# Patient Record
Sex: FEMALE | Race: OTHER | NOT HISPANIC OR LATINO
[De-identification: names, ages, dates, MRNs, and addresses within clinical notes are randomized per-mention and may not be internally consistent; named-entity substitution may affect disease eponyms.]

---

## 2017-03-01 ENCOUNTER — TRANSCRIPTION ENCOUNTER (OUTPATIENT)
Age: 22
End: 2017-03-01

## 2017-10-27 ENCOUNTER — TRANSCRIPTION ENCOUNTER (OUTPATIENT)
Age: 22
End: 2017-10-27

## 2017-11-08 ENCOUNTER — TRANSCRIPTION ENCOUNTER (OUTPATIENT)
Age: 22
End: 2017-11-08

## 2018-04-01 ENCOUNTER — OUTPATIENT (OUTPATIENT)
Dept: OUTPATIENT SERVICES | Facility: HOSPITAL | Age: 23
LOS: 1 days | End: 2018-04-01
Payer: MEDICAID

## 2018-04-01 PROCEDURE — G9001: CPT

## 2018-04-09 DIAGNOSIS — R69 ILLNESS, UNSPECIFIED: ICD-10-CM

## 2018-04-27 ENCOUNTER — TRANSCRIPTION ENCOUNTER (OUTPATIENT)
Age: 23
End: 2018-04-27

## 2018-09-25 ENCOUNTER — EMERGENCY (EMERGENCY)
Facility: HOSPITAL | Age: 23
LOS: 1 days | Discharge: HOME | End: 2018-09-25
Attending: EMERGENCY MEDICINE
Payer: MEDICAID

## 2018-09-25 VITALS
DIASTOLIC BLOOD PRESSURE: 86 MMHG | HEIGHT: 64 IN | TEMPERATURE: 98 F | WEIGHT: 138.01 LBS | SYSTOLIC BLOOD PRESSURE: 126 MMHG | HEART RATE: 87 BPM | RESPIRATION RATE: 16 BRPM | OXYGEN SATURATION: 96 %

## 2018-09-25 PROCEDURE — 99282 EMERGENCY DEPT VISIT SF MDM: CPT

## 2018-09-25 PROCEDURE — 99283 EMERGENCY DEPT VISIT LOW MDM: CPT

## 2018-09-25 NOTE — ED PROCEDURE NOTE - CPROC ED POST PROC CARE GUIDE1
Instructed patient/caregiver regarding signs and symptoms of infection./PMD/neuro/hand follow up/Instructed patient/caregiver to follow-up with primary care physician./Verbal/written post procedure instructions were given to patient/caregiver.

## 2018-09-25 NOTE — ED PROVIDER NOTE - MEDICAL DECISION MAKING DETAILS
23F presenting with numbness/ tingling in the RUE x 3d, no focal deficits, no sx of CTS - will get Ortho f.u. No XR needed.

## 2018-09-25 NOTE — ED ADULT NURSE NOTE - CHPI ED NUR SYMPTOMS NEG
no change in level of consciousness/no confusion/no loss of consciousness/no blurred vision/no vomiting/no nausea/no dizziness/no fever

## 2018-09-25 NOTE — ED PROVIDER NOTE - NS ED ROS FT
rt. numbness and tingling in Rt. Digit 3/4/5 and from elbow to forearm/ intermittent  No focal deficits  Strength in tact in all extremities  No Prayer sx, No Phalen's sx.

## 2018-09-25 NOTE — ED PROVIDER NOTE - ATTENDING CONTRIBUTION TO CARE
Attending MD Hall: I personally have seen and examined this patient.  Resident note reviewed and agree on plan of care and except where noted.  See below for details.     23F with no reported PMH/PSH/Meds/Allergies presents to the ED with R pinky, ring fingers, middle finger numbness.  Reports started about 4 days ago with pain to the base of the R middle finger and numbness to same.  Reports came in today because developed sudden onset of R pinky, ring finger numbness and worsened middle finger numbness.  Reports no loss of  strength.  Reports no trauma.  Denies typing for work/lots of typing.  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  Denies trauma.  Reports with palpation of the base of the middle finger, pain radiates up ventral aspect of forearm.  Denies fevers, chills.  On exam, NAD, head NCAT, unlabored breathing, moving all extremities, good  strength bilaterally, +2 radials, decreased sensation to pinky, ring finger and middle finger, +tenderness to palpation over MCP palmar aspect, no erythema, FROM at fingers, cap refill <2 seconds, neg Phalen's test; A/P: 23F with finger numbness, Ddx ?median, ?ulnar ?carpal nerve inflammation, case discussed with Dr. Cuba, will discharge with recommendation for NSAIDs, PMD, neuro or hand follow up, velcro premade wrist splint. Follow up instructions given, importance of follow up emphasized, return to ED parameters reviewed and patient verbalized understanding.  All questions answered, all concerns addressed.

## 2018-09-25 NOTE — ED PROVIDER NOTE - OBJECTIVE STATEMENT
23F presenting with intermittent numbness in the rt. arm x 3d. 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 Pt also endorsed to some tingling radiating from the rt. elbow to the forearm. No trauma. LMP 2wk ago.

## 2018-09-25 NOTE — ED ADULT NURSE NOTE - OBJECTIVE STATEMENT
Pt c/o numbness.  Pt reports that 3 days ago she began to have numbness to her R 3rd finger, constant since it started.  Since then the numbness has spread to her whole hand and she has pain at the base of her 3rd finger.  She denies any hx of similar symptoms or n/t elsewhere.  She denies any recent falls, traumas, MVC, back injury.  No hx of back injuries or surgeries.  Moderate weakness in R hand grasp compared to L.  Pt is R handed, works as counselor, having difficulty typing.  Pt very well appearing, no other complaints.

## 2018-10-05 ENCOUNTER — APPOINTMENT (OUTPATIENT)
Dept: ORTHOPEDIC SURGERY | Facility: CLINIC | Age: 23
End: 2018-10-05
Payer: MEDICAID

## 2018-10-05 VITALS
HEART RATE: 76 BPM | WEIGHT: 138 LBS | SYSTOLIC BLOOD PRESSURE: 105 MMHG | BODY MASS INDEX: 23.56 KG/M2 | DIASTOLIC BLOOD PRESSURE: 73 MMHG | HEIGHT: 64 IN

## 2018-10-05 DIAGNOSIS — Z78.9 OTHER SPECIFIED HEALTH STATUS: ICD-10-CM

## 2018-10-05 DIAGNOSIS — Z80.9 FAMILY HISTORY OF MALIGNANT NEOPLASM, UNSPECIFIED: ICD-10-CM

## 2018-10-05 DIAGNOSIS — R20.0 ANESTHESIA OF SKIN: ICD-10-CM

## 2018-10-05 DIAGNOSIS — Z82.61 FAMILY HISTORY OF ARTHRITIS: ICD-10-CM

## 2018-10-05 DIAGNOSIS — Z86.2 PERSONAL HISTORY OF DISEASES OF THE BLOOD AND BLOOD-FORMING ORGANS AND CERTAIN DISORDERS INVOLVING THE IMMUNE MECHANISM: ICD-10-CM

## 2018-10-05 DIAGNOSIS — R20.2 ANESTHESIA OF SKIN: ICD-10-CM

## 2018-10-05 PROCEDURE — 99203 OFFICE O/P NEW LOW 30 MIN: CPT

## 2019-01-15 ENCOUNTER — EMERGENCY (EMERGENCY)
Facility: HOSPITAL | Age: 24
LOS: 1 days | Discharge: HOME | End: 2019-01-15
Attending: EMERGENCY MEDICINE
Payer: MEDICAID

## 2019-01-15 ENCOUNTER — TRANSCRIPTION ENCOUNTER (OUTPATIENT)
Age: 24
End: 2019-01-15

## 2019-01-15 VITALS
RESPIRATION RATE: 14 BRPM | WEIGHT: 138.01 LBS | OXYGEN SATURATION: 99 % | HEART RATE: 68 BPM | TEMPERATURE: 97 F | SYSTOLIC BLOOD PRESSURE: 118 MMHG | DIASTOLIC BLOOD PRESSURE: 78 MMHG

## 2019-01-15 VITALS
RESPIRATION RATE: 16 BRPM | DIASTOLIC BLOOD PRESSURE: 77 MMHG | OXYGEN SATURATION: 99 % | SYSTOLIC BLOOD PRESSURE: 120 MMHG | TEMPERATURE: 98 F | HEART RATE: 67 BPM

## 2019-01-15 PROCEDURE — 71046 X-RAY EXAM CHEST 2 VIEWS: CPT

## 2019-01-15 PROCEDURE — 71046 X-RAY EXAM CHEST 2 VIEWS: CPT | Mod: 26

## 2019-01-15 PROCEDURE — 99284 EMERGENCY DEPT VISIT MOD MDM: CPT | Mod: 25

## 2019-01-15 PROCEDURE — 93010 ELECTROCARDIOGRAM REPORT: CPT

## 2019-01-15 PROCEDURE — 99283 EMERGENCY DEPT VISIT LOW MDM: CPT

## 2019-01-15 PROCEDURE — 93005 ELECTROCARDIOGRAM TRACING: CPT

## 2019-01-15 RX ORDER — ACETAMINOPHEN 500 MG
650 TABLET ORAL ONCE
Qty: 0 | Refills: 0 | Status: COMPLETED | OUTPATIENT
Start: 2019-01-15 | End: 2019-01-15

## 2019-01-15 RX ADMIN — Medication 650 MILLIGRAM(S): at 21:22

## 2019-01-15 NOTE — ED PROVIDER NOTE - NSFOLLOWUPINSTRUCTIONS_ED_ALL_ED_FT
Please follow-up with your Primary doctor    Tylenol 650mg every 4 hours as needed for pain. Pepcid 20mg twice a day for heartburn/reflux.     Chest Pain    Chest pain can be caused by many different conditions which may or may not be dangerous. Causes include heartburn, lung infections, heart attack, blood clot in lungs, skin infections, strain or damage to muscle, cartilage, or bones, etc. In addition to a history and physical examination, an electrocardiogram (ECG) or other lab tests may have been performed to determine the cause of your chest pain. Follow up with your primary care provider or with a cardiologist as instructed.     SEEK IMMEDIATE MEDICAL CARE IF YOU HAVE ANY OF THE FOLLOWING SYMPTOMS: worsening chest pain, coughing up blood, unexplained back/neck/jaw pain, severe abdominal pain, dizziness or lightheadedness, fainting, shortness of breath, sweaty or clammy skin, vomiting, or racing heart beat. These symptoms may represent a serious problem that is an emergency. Do not wait to see if the symptoms will go away. Get medical help right away. Call 911 and do not drive yourself to the hospital.      Abdominal Pain    Many things can cause abdominal pain. Many times, abdominal pain is not caused by a disease and will improve without treatment. Your health care provider will do a physical exam to determine if there is a dangerous cause of your pain; blood tests and imaging may help determine the cause of your pain. However, in many cases, no cause may be found and you may need further testing as an outpatient. Monitor your abdominal pain for any changes.     SEEK IMMEDIATE MEDICAL CARE IF YOU HAVE ANY OF THE FOLLOWING SYMPTOMS: worsening abdominal pain, uncontrollable vomiting, profuse diarrhea, inability to have bowel movements or pass gas, black or bloody stools, fever accompanying chest pain or back pain, or fainting. These symptoms may represent a serious problem that is an emergency. Do not wait to see if the symptoms will go away. Get medical help right away. Call 911 and do not drive yourself to the hospital.

## 2019-01-15 NOTE — ED PROVIDER NOTE - MEDICAL DECISION MAKING DETAILS
Arelis Plasencia) : 24 y/o F pt with PMHx of asthma presents to the ED for intermittent chest pain x5 weeks worsening today. No acs risk factors reproducible on exam. Likely costochondritis, XR, EKG r/o other pathology

## 2019-01-15 NOTE — ED PROVIDER NOTE - NS_ ATTENDINGSCRIBEDETAILS _ED_A_ED_FT
Arelis Plasencia MD - Attending Physician: The scribe's documentation has been prepared under my direction and personally reviewed by me in its entirety. I confirm that the note above accurately reflects all work, treatment, procedures, and medical decision making performed by me.

## 2019-01-15 NOTE — ED PROVIDER NOTE - PROGRESS NOTE DETAILS
Arelis Plasencia) : Pt intermittently c/o CP and abd pain. Refusing all pain control, refusing further workup. XR negative, EKG normal. Discussed home treatment, tracking pain onset and triggers, f/u with PMD, return precautions discussed Arelis Plasencia) : Pt intermittently c/o CP and abd pain. Refusing all pain control, refusing further workup. Mother thinks pain is anxiety related. XR negative, EKG normal. Discussed home treatment, tracking pain onset and triggers, f/u with PMD, return precautions discussed

## 2019-01-15 NOTE — ED ADULT NURSE NOTE - CHPI ED NUR SYMPTOMS NEG
no back pain/no syncope/no chills/no diaphoresis/no dizziness/no fever/no congestion/no shortness of breath/no vomiting

## 2019-01-15 NOTE — ED PROVIDER NOTE - OBJECTIVE STATEMENT
Arelis Plasencia) : 22 y/o F pt with PMHx of asthma c/o CP x5 weeks. Never took any medication for the pain. States that sx started off intermittently but states sx worsened today. Notes pain was constant today. Notes pain is waxing and waning, with the worse episodes lasting about 20 minutes. Also notes intermittent SOB. Pt was at work today but went to urgent care for her worsening sx. Urgent care sent pt to the ED. States that she had an URI a week ago including fever. EMS told her that records show that she's pre-diabetic. Denies hx of similar sx or any other complaints. Arelis Plasencia) : 22 y/o F pt with PMHx of asthma c/o CP x5 weeks. Never took any medication for the pain. States that sx started off intermittently but states sx worsened today. Notes pain was constant today. Notes pain is waxing and waning, with the worse episodes lasting about 20 minutes. Also notes intermittent SOB. Pt was at work today but went to urgent care for her worsening sx. Urgent care sent pt to the ED. States that she had an URI a week ago including fever. EMS told her that records show that she's pre-diabetic. Denies hx of similar sx or any other complaints. She notes all medicine, including tylenol, motrin, tums, pepcid, maalox all make the pain worse

## 2019-01-15 NOTE — ED CLERICAL - NS ED CLERK NOTE PRE-ARRIVAL INFORMATION; ADDITIONAL PRE-ARRIVAL INFORMATION
CC/Reason For referral: Chest Pain, R/O ACS  Preferred Consultant(if applicable): N/A  Who admits for you (if needed): N/A  Do you have documents you would like to fax over? Yes, documents sent with EMS as well as faxed  Would you still like to speak to an ED attending? No

## 2019-01-15 NOTE — ED ADULT NURSE NOTE - OBJECTIVE STATEMENT
pt c/o chest pain with radiation down left arm.  he has had a cardiology work up that was negative 22 y/o Female presented to the ED via EMS from Urgent care. pt states for the past 5 weeks she has been having mid sternum chest pain. pt has been seen by PCP AND CARDIOLOGIST. was unhappy with care received there and wanted a second opinion. pt has hx of  asthma. Never took any medication for the pain. States that sx started off intermittently but states sx worsened today. Notes pain was constant today. Notes pain is waxing and waning, with the worse episodes lasting about 20 minutes. Also notes intermittent SOB. Pt was at work today but went to urgent care for her worsening sx. Urgent care sent pt to the ED. States that she had an URI a week ago including fever. EMS told her that records show that she's pre-diabetic. Denies hx of similar sx or any other complaints. mother at bedside. EKG performed.

## 2019-01-23 ENCOUNTER — APPOINTMENT (OUTPATIENT)
Dept: CARDIOLOGY | Facility: CLINIC | Age: 24
End: 2019-01-23
Payer: MEDICAID

## 2019-01-23 ENCOUNTER — NON-APPOINTMENT (OUTPATIENT)
Age: 24
End: 2019-01-23

## 2019-01-23 VITALS
WEIGHT: 134 LBS | TEMPERATURE: 98.3 F | HEART RATE: 73 BPM | BODY MASS INDEX: 22.88 KG/M2 | SYSTOLIC BLOOD PRESSURE: 106 MMHG | DIASTOLIC BLOOD PRESSURE: 69 MMHG | HEIGHT: 64 IN | RESPIRATION RATE: 12 BRPM | OXYGEN SATURATION: 98 %

## 2019-01-23 DIAGNOSIS — I34.1 NONRHEUMATIC MITRAL (VALVE) PROLAPSE: ICD-10-CM

## 2019-01-23 DIAGNOSIS — J45.909 UNSPECIFIED ASTHMA, UNCOMPLICATED: ICD-10-CM

## 2019-01-23 DIAGNOSIS — Z82.49 FAMILY HISTORY OF ISCHEMIC HEART DISEASE AND OTHER DISEASES OF THE CIRCULATORY SYSTEM: ICD-10-CM

## 2019-01-23 DIAGNOSIS — R07.89 OTHER CHEST PAIN: ICD-10-CM

## 2019-01-23 PROCEDURE — 93306 TTE W/DOPPLER COMPLETE: CPT

## 2019-01-23 PROCEDURE — 93000 ELECTROCARDIOGRAM COMPLETE: CPT

## 2019-01-23 PROCEDURE — 99203 OFFICE O/P NEW LOW 30 MIN: CPT | Mod: 25

## 2019-01-23 NOTE — REVIEW OF SYSTEMS
[Shortness Of Breath] : shortness of breath [Dyspnea on exertion] : not dyspnea during exertion [Chest Pain] : chest pain [Lower Ext Edema] : no extremity edema [Palpitations] : no palpitations [Negative] : Heme/Lymph

## 2019-01-23 NOTE — HISTORY OF PRESENT ILLNESS
[FreeTextEntry1] : Vee is a 23-year-old female who presents with recent onset of chest pain. She was seen by cardiologist who thought it was stress. She tried mindfulness and reflexology with no effect. She experiences it daily and it can wake her up from sleep. She describes it as a heaviness in her chest that occasionally goes to her back but usually just in one area lasting 20-30 minutes. Sometimes it makes her stop what she is doing because it takes her breath away. Recently started taking aspirin with some relief.

## 2019-01-23 NOTE — DISCUSSION/SUMMARY
[FreeTextEntry1] : The patient is a 23-year-old female who presents with atypical chest pain, possible MVP and some features concerning for pericarditis. ECG normal. Recommend changing to NSAIDS daily until symptoms resolve. ECHO today to assess for MVP and effusion. All questions answered.

## 2019-01-23 NOTE — PHYSICAL EXAM
[General Appearance - Well Developed] : well developed [Normal Appearance] : normal appearance [Well Groomed] : well groomed [General Appearance - Well Nourished] : well nourished [No Deformities] : no deformities [General Appearance - In No Acute Distress] : no acute distress [Normal Conjunctiva] : the conjunctiva exhibited no abnormalities [Eyelids - No Xanthelasma] : the eyelids demonstrated no xanthelasmas [Normal Oral Mucosa] : normal oral mucosa [No Oral Pallor] : no oral pallor [No Oral Cyanosis] : no oral cyanosis [Normal Jugular Venous A Waves Present] : normal jugular venous A waves present [Normal Jugular Venous V Waves Present] : normal jugular venous V waves present [No Jugular Venous Lloyd A Waves] : no jugular venous lloyd A waves [Heart Rate And Rhythm] : heart rate and rhythm were normal [Heart Sounds] : normal S1 and S2 [Murmurs] : no murmurs present [Respiration, Rhythm And Depth] : normal respiratory rhythm and effort [Exaggerated Use Of Accessory Muscles For Inspiration] : no accessory muscle use [Auscultation Breath Sounds / Voice Sounds] : lungs were clear to auscultation bilaterally [Abdomen Soft] : soft [Abdomen Tenderness] : non-tender [Abdomen Mass (___ Cm)] : no abdominal mass palpated [Abnormal Walk] : normal gait [Gait - Sufficient For Exercise Testing] : the gait was sufficient for exercise testing [Nail Clubbing] : no clubbing of the fingernails [Cyanosis, Localized] : no localized cyanosis [Petechial Hemorrhages (___cm)] : no petechial hemorrhages [Skin Color & Pigmentation] : normal skin color and pigmentation [] : no rash [No Venous Stasis] : no venous stasis [Skin Lesions] : no skin lesions [No Skin Ulcers] : no skin ulcer [No Xanthoma] : no  xanthoma was observed [Oriented To Time, Place, And Person] : oriented to person, place, and time [Affect] : the affect was normal [Mood] : the mood was normal [No Anxiety] : not feeling anxious

## 2019-12-02 ENCOUNTER — TRANSCRIPTION ENCOUNTER (OUTPATIENT)
Age: 24
End: 2019-12-02